# Patient Record
(demographics unavailable — no encounter records)

---

## 2024-10-21 NOTE — PLAN
[TextEntry] :  1.	Informed Consent obtained for the combined cardiac sequencing and Del/Dup panel 138 genes (#245)   2.	Blood drawn today to be sent to [facility] for analysis, pending insurance authorization.  3.	 Mr. ORVILLE WORTHY  was provided an information sheet about his genetic testing.  4.	A follow-up appointment was scheduled in 2-3 months to discuss genetic testing results in person. Results generally return in 6-8 weeks.  For any additional questions please call  Love George MS, [facility] or Delfino Mazariegos MD, PhD at [phone] Time spent counseling the patient during the visit was 60 min. >50% time spent in care coordination /counseling for discussion of cardiac risk and appropriate management.   patient seen with Love George MS, [facility] for genetic counselling

## 2024-10-21 NOTE — HISTORY OF PRESENT ILLNESS
[TextEntry] : ORVILLE WORTHY is a 48 yo M PMH early onset heart block s/p PPM, prior hx asthma  He first came to cardiology attention at age 39 during a routine cardiac EKG and was told that he had RB. Last summer he felt fatigued with exertion and went to hospital and was found to have a Heart block.  He stated that he was camping a couple of weeks prior- he did not show any tick bites, no rash, or Lyme disease.    Patient presents today for cardiogenomics evaluation.

## 2024-10-21 NOTE — SIGNATURES
[TextEntry] : Delfino Mazariegos MD, PhD  Medical Director Program for Cardiac Genetics, Genomics and Precision Medicine Department of Cardiology [facility]  [employer] at [address] [place] [address] Tel: [phone] Fax: [phone]  ([facility] office) [address], 3rd floor (between [address] and [address]) [place] [address] (p) [phone] (f) [phone]

## 2024-10-21 NOTE — DISCUSSION/SUMMARY
[TextEntry] : ORVILLE WORTHY is a 50 yo M PMH early onset heart block s/p PPM.   The differences between hereditary and sporadic arrhythmia and cardiomyopathy were reviewed with the patient.  Identifying genetic causes of His condition may change medical management for the patient and may affect the healthcare of other family members. We discussed genetic tests that we could perform to address the possible genetic causes of his family history of cardiomyopathy.   We also reviewed the risks, benefits, limitations, and implications of genetic testing.  After a review of testing options, the patient elected to undergo testing for [facility] Combined Cardiac Sequencing and Del/Dup Panel (test code 935), which analyzes 138 genes that are associated with varying levels of risk for cardiomyopathy and arrhythmia. We reviewed the three possible results for each gene on this panel: positive, negative and variant of uncertain significance (VUS).  If the results are positive, we will discuss the risks and management options associated with that susceptibility gene and genetic testing for family members.  Pedigree was reviewed with the patient to identify those who should be tested if the patient is positive.  If results are negative or a VUS is identified, the patient of heart block.   Patient expressed understanding of the presented information and satisfaction with having all of His questions and concerns addressed.   We will follow up once the results are in.

## 2024-10-21 NOTE — FAMILY HISTORY
[FreeTextEntry2] : [place]  [FreeTextEntry3] : ? [TextEntry] : A three-generation family history was constructed and scanned into [employer]. Family history is significant for: father dec 82 yo heart attack, sudden, details unk maternal aunt hx heart surgery details unk maternal uncle hx open heart surgery  Paternal grandfather dec young details unk  children: 10 yo son and one stepson

## 2024-10-21 NOTE — PHYSICAL EXAM
[Pectus Deformity] : no pectus deformity [Normal] : without joint laxity or contractures [Tremor] : no tremor [de-identified] : 5/5 5/5 UE and LE strength

## 2024-10-21 NOTE — ASSESSMENT
[TextEntry] : ORVILLE WORTHY  is a 49 year M  with a history of early onset heart block s/p PPM,  his family history is significant for SCD. The differences between hereditary and sporadic insert diagnosis were reviewed with the patient.  He  has elected to undergo genetic testing due to concerns for  personal history, definitive diagnosis, disease management, family history, concern for the health of family members . Results may change medical management for the patient and may affect the healthcare of other family members. He  expressed understanding of the presented information and satisfaction with having all of His questions and concerns addressed

## 2024-10-21 NOTE — DISCUSSION/SUMMARY
[TextEntry] : We reviewed the risks, benefits, limitations, and implications of genetic testing.  Additionally, we examined the patients motivation for testing, and the emotional ramifications of the test results.  The patient is aware that results may impact family members.  Counseling resources are available if requested.   [place], the Genetic Information Non-discrimination Act protects most people from discrimination in health insurance and employment at firms with over 50 employees.  [employer] does not protect against use of genetic information by life insurance, disability, or long-term care insurers.  Further information on other limitations is available at www.[email or web address].org.   After a review of testing options, the patient elected to the combined cardiac panel offered through [facility]. . Panels contain 138  genes associated with varying levels of risk for early onet heart block, arrythmia and SCD. We reviewed the three possible results for each gene on this panel: positive, negative and variant of uncertain significance (VUS).  We discussed that panel testing could result in incidental findings, such as identifying a positive result in a gene with cardiac risks not seen in the current personal or discussed family history. If results are positive, we would discuss the  risks and management options associated with that cardiac condition susceptibility gene and discuss genetic testing for family members.  Pedigree was reviewed with the patient to identify those who should be tested if the patient is positive.  If results are negative or a VUS is identified, the patient would continue to be managed based on personal and family history of their  cardiac condition.

## 2024-10-21 NOTE — REASON FOR VISIT
[FreeTextEntry3] : Dear Dr. Frye, Dr. Francis, Dr. Johnson       . I saw your patient ORVILLE WORTHY on 10/21/2024 . Please see the note below for the assessment and plan.   ORVILLE WORTHY  was seen  for an initial consultation at the Cardiogenomics Program at [facility] on 10/21/2024.   Mr. WORTHY was referred by Dr. Frye for hereditary cardiac predisposition risk assessment and counseling, due to hx heart block

## 2024-10-21 NOTE — ASSESSMENT
[TextEntry] : ORVILLE WORTHY is a 50 yo M PMH early onset heart block s/p PPM  The differences between hereditary and sporadic cardiomyopathy and arrhythmias were reviewed with the patient. He has elected to undergo genetic testing due to concerns for personal history, definitive diagnosis, disease management, family history, and concern for the health of family members. Results may change medical management for the patient and may affect the healthcare of other family members. The patient expressed understanding of the presented information and satisfaction with having all of His questions and concerns addressed. [facility] Combined Cardiomyopathy and Arrhythmia panel will be ordered today.

## 2024-10-21 NOTE — FAMILY HISTORY
[FreeTextEntry1] : FamilyHistory_20_twCiteListControlStart FamilyHistory_20_twCiteListControlEnd [id number]  A four-generation family history was constructed and scanned into [employer].  Family history is significant for:  father dec 82 yo heart attack, Sudden, details unk maternal aunt hx heart surgery details unk maternal uncle hx open heart surgery    paternal grandfather dec young details unk children: 10 yo son full  and an additional step son  his maternal families originate from [place] and paternal families originate from [place] [facility] .  No Ashkenazi Yarsani ancestry.  Family history was negative for consanguinity   No family history of SIDS

## 2024-10-21 NOTE — REASON FOR VISIT
[FreeTextEntry3] : ORVILLE WORTHY was seen for an initial consultation at the Cardiogenomics Program at [facility] on 10/21/2024. Mr. WORTHY was referred by Dr. Frye for hereditary cardiac predisposition risk assessment and counseling, due to PMH of heart block.

## 2024-10-21 NOTE — PLAN
[TextEntry] : 1. Informed Consent obtained for the Combined Cardiac panel will be ordered today.  2. Blood drawn today to be sent to [facility] for analysis, pending insurance authorization. 3. A follow-up appointment was scheduled in 2 months to discuss genetic testing results. The results are usually back in 4-6 weeks.   For any additional questions, please call Doris Caro, MS, [facility], Cardiogenomics Program, at [phone]  Love George, MS, [facility] Assistant Chief, Pediatric Cardiogenomics, [facility] Director Genetics, Program for Cardiac Genetics, Genomics and Precision Medicine  in the Departments of Pediatrics and Cardiology, [facility] of [facility], [facility]

## 2024-10-21 NOTE — HISTORY OF PRESENT ILLNESS
[FreeTextEntry1] : ORVILLE WORTHY is a 50 yo M PMH early onset heart block s/p PPM, prior hx asthma  told he had a RBBB on EKG about 10 yrs ago   felt bad over the summer, fatigue with exertion went to the hospital and found to be in heart block  no hx of tick bites , no rash today he  is referred for a cardiogenomic evaluation

## 2025-01-06 NOTE — HISTORY OF PRESENT ILLNESS
[FreeTextEntry1] : ORVILLE WORTHY is a 48 yo M PMH early onset heart block s/p PPM, prior hx asthma  told he had a RBBB on EKG about 10 yrs ago   felt bad over the summer, fatigue with exertion went to the hospital and found to be in heart block  no hx of tick bites , no rash today he  is referred for a cardiogenomic evaluation

## 2025-01-06 NOTE — SIGNATURES
[TextEntry] : Delfino Mazariegos MD, PhD  Medical Director Program for Cardiac Genetics, Genomics and Precision Medicine Department of Cardiology [facility]  [employer] at [address] [place] [address] Tel: [phone] Fax: [phone]  ([facility] office) [address], 3rd floor (between [address] and [address]) [place] [address] (p) [phone] (f) [phone]

## 2025-01-06 NOTE — DISCUSSION/SUMMARY
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 Pathogenic variants in the DSP gene cause a broad spectrum of autosomal dominant and recessive cardio-cutaneous phenotypes including cardiomyopathy and various skin and hair findings, which exhibit reduced penetrance and variable expressivity. DSP-related cardiomyopathies have been categorized as arrhythmogenic right ventricular cardiomyopathy (ARVC), arrhythmogenic cardiomyopathy (ACM), and dilated cardiomyopathy (DCM), in addition to being recognized as its own distinct entity, referred to as desmoplakin cardiomyopathy (PMID: 21616587, 66077072, 09185391, 16825831, 85139487). While both ventricles can be affected, DSP-related cardiomyopathy predominantly affects the left ventricle and is associated with a high risk for ventricular arrhythmias, sudden cardiac death, episodic myocardial injury/inflammation, fibrosis, and heart failure (PMID: 50822405, 85145376, 32850166, 41000560, 76797887). DSP-related cutaneous features include palmoplantar keratoderma, curly or woolly hair, skin fragility, erythrokeratodermia, alopecia, and nail changes, among others (PMID: 42677552, 66621252, 62793658, 74053942). Individuals with autosomal dominant DSP-phenotypes may present with isolated cardiomyopathy, isolated palmoplantar keratoderma and/or other cutaneous findings, or a combination of cardiac and cutaneous features (PMID: 3272060, 41290287, 01882868, 29795580). Autosomal recessive DSP-related phenotypes, sometimes referred to as Begum syndrome, are typically characterized by ACM or DCM with woolly hair and palmoplantar keratoderma, although not all individuals with biallelic variants develop cardiomyopathy (PMID: 29496883, 67055725, 57773889, 76143338). Additional specific clinical presentations along a clinical continuum include erythrokeratodermia-cardiomyopathy (EKC) syndrome, severe dermatitis, multiple allergies, and metabolic wasting (SUKHJINDER) syndrome, and lethal acantholytic epidermolysis bullosa (PMID: 69480641, 86896171, 66600453, 55162146). Disease expression may be influenced by the type and location of the DSP variant(s) as biallelic variants resulting in significant or complete loss of function are expected to be associated with the most severe clinical presentation (PMID: 77482598, 85659853, 08461017). Truncating and missense variants have been reported throughout the DSP gene (HGMD; PMID: 10266307, 07893512, 64804328). p.([id number]) (CGG>CAG): c.1715 G>A in exon 14 of the DSP gene (NM_004415.2) Identified in a patient with unexplained cardiac arrest (UCA) in published literature (PMID: 15113378) Not observed at significant frequency in large population cohorts (gnomAD) In silico analysis indicates that this missense variant does not alter protein structure/function We interpret this as a Variant of Uncertain Significance.  -   No known pathogenic variants were identified in any of the 138 genes evaluated  a variant of uncertain significance (VUS)  was identified in the DSP gene that is of low clinical suspicion  the limitations of genetic testing were discussed with ORVILLE WORTHY    for him recommend continued care as per his cardiology team, known genetic reasons for early onset heart block including LMNA have been ruled out  for his family At this time we recommend all of his  first degree relatives undergo a clinical cardiac evaluation with history, physical exam, EKG and ECHO. If their initial clinical evaluation is normal they should continued to have clinical cardiac evaluation with imaging every 3-5 years (for adults).

## 2025-01-06 NOTE — ASSESSMENT
[TextEntry] : ORVILLE WORTHY is a 48 yo M PMH early onset heart block s/p PPM  The limitation of genetic testing was discussed with the patient. The patient tested negative for any pathogenic or likely pathogenic variants but was found positive for a variant of uncertain significance in the DSP gene, which does not provide a molecular diagnosis for this patient.   As these results are not diagnostic, Mr Worthy and at-risk relatives should receive clinical evaluation and management based on medical and family history. It is important that the patient's long-term management and surveillance are continued as recommended by his cardiologist. We also recommend that all of his first-degree relatives (mother, brother and his son)) undergo a clinical cardiac evaluation with history, physical exam, EKG and ECHO.  Predictive testing for variants of uncertain significance is not recommended for family members.  Targeted testing of family members for the variants in the DSP gene may be considered to determine if the variants are segregating with the phenotype in this family. However, at this point there are no family members who would be informative to the segregation study.  The classification of variants may change over time as a result of new variant interpretations, guidelines, and /or new information. Genetic knowledge changes rapidly. We encourage re-contacting the Cardiogenomics program at [facility] if there are significant changes in family or personal health.  Patient expressed understanding of the presented information and satisfaction with having all of His questions and concerns addressed.

## 2025-01-06 NOTE — DISCUSSION/SUMMARY
[TextEntry] : ORVILLE WORTHY is a 50 yo M PMH early onset heart block s/p PPM.   Patient underwent genetic testing and presents today for results.   RESULTS Combined Cardiac Sequencing and Deletion/Duplication Panel-138 genes- negative for any pathogenic or likely pathogenic variants. He was positive for a variant of uncertain significance in the DSP gene which does not provide molecular diagnosis for this patient.   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 DSP-related cardiomyopathies have been categorized as arrhythmogenic right ventricular cardiomyopathy (ARVC), arrhythmogenic cardiomyopathy (ACM), and dilated cardiomyopathy (DCM), in addition to being recognized as its own distinct entity, referred to as desmoplakin cardiomyopathy (PMID: 53783092, 75856617, 65651474, 00490699, 39361012).  The specific variant identified in Mr Worthy: p.(Fav400Cyh) (CGG>CAG): c.1715 G>A in exon 14 of the DSP gene (NM_004415.2) --Identified in a patient with unexplained cardiac arrest (UCA) in published literature (PMID: 14987353) --Not observed at significant frequency in large population cohorts (gnomAD) --In silico analysis indicates that this missense variant does not alter protein structure/function  In summary, current data are insufficient to determine the role of this variant in disease.   The limitation of genetic testing was discussed with the patient. We discussed that he was negative for any pathogenic or likely pathogenic variants but was found positive for a variant of uncertain significance in the DSP gene which does not provide molecular diagnosis for this patient. As these results are not diagnostic, Mr Worthy and at-risk relatives should receive clinical evaluation and management based on medical and family history. It is important that the patient's long-term management and surveillance is continued as recommended by his cardiologist. We also recommend that all of his first-degree relatives (mother, brother and his son)) undergo a clinical cardiac evaluation with history, physical exam, EKG and ECHO.   Predictive testing for variants of uncertain significance is not recommended for family members.  Targeted testing of family members for the variants in the DSP gene may be considered to determine if the variants are segregating with the phenotype in this family. However, at this point there are no family members who would be informative to the segregation study.  The classification of variants may change over time as a result of new variant interpretations, guidelines, and /or new information. Genetic knowledge changes rapidly.  We encourage re-contacting the Cardiogenomics program at [facility] if there are significant changes in family or personal health.

## 2025-01-06 NOTE — PLAN
[TextEntry] : See above note for recommended management. A copy of genetic testing results and clinic note will be sent to  the referring cardiologist   or physician We encourage sharing these results with family members,  Contact the Cardiogenomics program at [facility] or the lab directly every few years to check on any changes in interpretation of a VUS, or if there are changes in the personal or family cardiac history.  Long-term management and surveillance for patient should be based on the patients clinical treatment as recommended by their cardiologist.   Any changes in cardiac surveillance should be discussed with the patients physician.  We remain available should there be any new information for personal or family history.  If there are any additional questions, please feel free to call the Cardiogenomics program at [facility], Love George MS, [facility] or Delfino Mazariegos MD, PhD at [phone] Time spent counseling the patient during the visit was 45 min. I spent 45 minutes on the encounter   Patient seen with Love George MS, CGC, board certified genetic counsellor

## 2025-01-06 NOTE — PLAN
[TextEntry] : 1. It is important that the patient's long-term management and surveillance are continued as recommended by her cardiologist.  His test results were sent to the patient and uploaded to his [facility] electronic medical chart. 2. At this time, we recommend all of his first-degree relatives (mother, son and brother) undergo a clinical cardiac evaluation with history, physical exam, EKG and ECHO. 3. Targeted testing for the VUS is not recommended at this time. 4. Follow-up with Cardiogenomics annually to check on the classification status of her VUS or PRN if any new concerns arise.  For any additional questions please call Love George MS, [facility], Cardiogenomic Program at [phone]   Love George MS, [facility] Assistant Chief, Pediatric Cardiogenomics, [facility] Director Genetics, Program for Cardiac Genetics, Genomics and Precision Medicine  in the Departments of Pediatrics and Cardiology, [facility] of Medicine, [facility]

## 2025-01-06 NOTE — REASON FOR VISIT
[FreeTextEntry3] : Dear Dr. Frye, Dr. Francis, Dr. Johnson       . I saw your patient ORVILLE WORTYH on 10/21/2024 . Please see the note below for the assessment and plan.   ORVILLE WORTHY  was seen  for an initial consultation at the Cardiogenomics Program at [facility] on 10/21/2024.   Mr. WORTHY was referred by Dr. Frye for hereditary cardiac predisposition risk assessment and counseling, due to hx heart block

## 2025-01-06 NOTE — FAMILY HISTORY
[FreeTextEntry2] : [place]  [FreeTextEntry3] : ? [TextEntry] : A three-generation family history was constructed and scanned into [employer]. Family history is significant for: father dec 82 yo heart attack, sudden, details unk maternal aunt hx heart surgery details unk maternal uncle hx open heart surgery  Paternal grandfather dec young details unk  children: 10 yo son and one stepson

## 2025-01-06 NOTE — FAMILY HISTORY
[FreeTextEntry1] : FamilyHistory_20_twCiteListControlStart FamilyHistory_20_twCiteListControlEnd [id number]  A four-generation family history was constructed and scanned into [employer].  Family history is significant for:  father dec 82 yo heart attack, Sudden, details unk maternal aunt hx heart surgery details unk maternal uncle hx open heart surgery    paternal grandfather dec young details unk children: 10 yo son full  and an additional step son  his maternal families originate from [place] and paternal families originate from [facility] .  No Ashkenazi Sikh ancestry.  Family history was negative for consanguinity   No family history of SIDS

## 2025-01-06 NOTE — HISTORY OF PRESENT ILLNESS
[TextEntry] : ORVILLE WORTHY is a 50 yo M PMH early onset heart block s/p PPM, prior hx asthma  He first came to cardiology attention at age 39 during a routine cardiac EKG and was told that he had RB. Last summer he felt fatigued with exertion and went to hospital and was found to have a Heart block.  He stated that he was camping a couple of weeks prior- he did not show any tick bites, no rash, or Lyme disease.   Patient presents for cadiogenomics evaluation.

## 2025-01-06 NOTE — ASSESSMENT
[TextEntry] : ORVILLE WORTHY  is a 49 year M  with a history of early onset heart block s/p PPM,  his family history is significant for SCD.     No known pathogenic variants were identified in any of the 138 genes evaluated  a variant of uncertain significance (VUS)  was identified in the DSP gene that is of low clinical suspicion  the limitations of genetic testing were discussed with ORVILLE WORTHY    for him recommend continued care as per his cardiology team, known genetic reasons for early onset heart block including LMNA have been ruled out  for his family At this time we recommend all of his  first degree relatives undergo a clinical cardiac evaluation with history, physical exam, EKG and ECHO. If their initial clinical evaluation is normal they should continued to have clinical cardiac evaluation with imaging every 3-5 years (for adults).      Genetic knowledge changes rapidly.  We encourage re-contacting the cardiogenomics program at [facility] if there are significant changes in family or personal health, and annually. ORVILLE WORTHY expressed understanding of the presented information and satisfaction with having his questions and concerns addressed.   EKG performed today for the above diagnosis.

## 2025-01-06 NOTE — PHYSICAL EXAM
[Normal] : without joint laxity or contractures [Pectus Deformity] : no pectus deformity [Tremor] : no tremor [de-identified] : 5/5 5/5 UE and LE strength